# Patient Record
Sex: FEMALE | Race: BLACK OR AFRICAN AMERICAN | NOT HISPANIC OR LATINO
[De-identification: names, ages, dates, MRNs, and addresses within clinical notes are randomized per-mention and may not be internally consistent; named-entity substitution may affect disease eponyms.]

---

## 2017-12-11 ENCOUNTER — APPOINTMENT (OUTPATIENT)
Dept: ORTHOPEDIC SURGERY | Facility: CLINIC | Age: 57
End: 2017-12-11
Payer: MEDICAID

## 2017-12-11 VITALS — DIASTOLIC BLOOD PRESSURE: 74 MMHG | HEART RATE: 71 BPM | SYSTOLIC BLOOD PRESSURE: 107 MMHG

## 2017-12-11 VITALS — BODY MASS INDEX: 33.29 KG/M2 | HEIGHT: 64 IN | WEIGHT: 195 LBS

## 2017-12-11 DIAGNOSIS — M54.16 RADICULOPATHY, LUMBAR REGION: ICD-10-CM

## 2017-12-11 DIAGNOSIS — M51.26 OTHER INTERVERTEBRAL DISC DISPLACEMENT, LUMBAR REGION: ICD-10-CM

## 2017-12-11 DIAGNOSIS — M51.36 OTHER INTERVERTEBRAL DISC DEGENERATION, LUMBAR REGION: ICD-10-CM

## 2017-12-11 PROCEDURE — 99203 OFFICE O/P NEW LOW 30 MIN: CPT

## 2019-01-03 ENCOUNTER — TRANSCRIPTION ENCOUNTER (OUTPATIENT)
Age: 59
End: 2019-01-03

## 2019-02-25 ENCOUNTER — APPOINTMENT (OUTPATIENT)
Dept: ORTHOPEDIC SURGERY | Facility: CLINIC | Age: 59
End: 2019-02-25
Payer: COMMERCIAL

## 2019-02-25 PROCEDURE — 99214 OFFICE O/P EST MOD 30 MIN: CPT

## 2019-03-22 ENCOUNTER — OUTPATIENT (OUTPATIENT)
Dept: OUTPATIENT SERVICES | Facility: HOSPITAL | Age: 59
LOS: 1 days | End: 2019-03-22
Payer: COMMERCIAL

## 2019-03-22 VITALS
SYSTOLIC BLOOD PRESSURE: 113 MMHG | HEIGHT: 64 IN | OXYGEN SATURATION: 99 % | TEMPERATURE: 98 F | WEIGHT: 195.99 LBS | DIASTOLIC BLOOD PRESSURE: 77 MMHG | RESPIRATION RATE: 16 BRPM | HEART RATE: 76 BPM

## 2019-03-22 DIAGNOSIS — R73.03 PREDIABETES: ICD-10-CM

## 2019-03-22 DIAGNOSIS — M65.311 TRIGGER THUMB, RIGHT THUMB: ICD-10-CM

## 2019-03-22 DIAGNOSIS — Z90.710 ACQUIRED ABSENCE OF BOTH CERVIX AND UTERUS: Chronic | ICD-10-CM

## 2019-03-22 DIAGNOSIS — Z01.818 ENCOUNTER FOR OTHER PREPROCEDURAL EXAMINATION: ICD-10-CM

## 2019-03-22 DIAGNOSIS — Z98.890 OTHER SPECIFIED POSTPROCEDURAL STATES: Chronic | ICD-10-CM

## 2019-03-22 DIAGNOSIS — M65.30 TRIGGER FINGER, UNSPECIFIED FINGER: ICD-10-CM

## 2019-03-22 LAB
ANION GAP SERPL CALC-SCNC: 9 MMOL/L — SIGNIFICANT CHANGE UP (ref 5–17)
BUN SERPL-MCNC: 16 MG/DL — SIGNIFICANT CHANGE UP (ref 7–23)
CALCIUM SERPL-MCNC: 9.9 MG/DL — SIGNIFICANT CHANGE UP (ref 8.4–10.5)
CHLORIDE SERPL-SCNC: 102 MMOL/L — SIGNIFICANT CHANGE UP (ref 96–108)
CO2 SERPL-SCNC: 29 MMOL/L — SIGNIFICANT CHANGE UP (ref 22–31)
CREAT SERPL-MCNC: 0.79 MG/DL — SIGNIFICANT CHANGE UP (ref 0.5–1.3)
GLUCOSE SERPL-MCNC: 84 MG/DL — SIGNIFICANT CHANGE UP (ref 70–99)
HBA1C BLD-MCNC: 6.3 % — HIGH (ref 4–5.6)
HCT VFR BLD CALC: 41 % — SIGNIFICANT CHANGE UP (ref 34.5–45)
HGB BLD-MCNC: 12.5 G/DL — SIGNIFICANT CHANGE UP (ref 11.5–15.5)
MCHC RBC-ENTMCNC: 26.5 PG — LOW (ref 27–34)
MCHC RBC-ENTMCNC: 30.5 GM/DL — LOW (ref 32–36)
MCV RBC AUTO: 86.9 FL — SIGNIFICANT CHANGE UP (ref 80–100)
PLATELET # BLD AUTO: 211 K/UL — SIGNIFICANT CHANGE UP (ref 150–400)
POTASSIUM SERPL-MCNC: 4.3 MMOL/L — SIGNIFICANT CHANGE UP (ref 3.5–5.3)
POTASSIUM SERPL-SCNC: 4.3 MMOL/L — SIGNIFICANT CHANGE UP (ref 3.5–5.3)
RBC # BLD: 4.72 M/UL — SIGNIFICANT CHANGE UP (ref 3.8–5.2)
RBC # FLD: 13.5 % — SIGNIFICANT CHANGE UP (ref 10.3–14.5)
SODIUM SERPL-SCNC: 140 MMOL/L — SIGNIFICANT CHANGE UP (ref 135–145)
WBC # BLD: 4.07 K/UL — SIGNIFICANT CHANGE UP (ref 3.8–10.5)
WBC # FLD AUTO: 4.07 K/UL — SIGNIFICANT CHANGE UP (ref 3.8–10.5)

## 2019-03-22 PROCEDURE — 83036 HEMOGLOBIN GLYCOSYLATED A1C: CPT

## 2019-03-22 PROCEDURE — 80048 BASIC METABOLIC PNL TOTAL CA: CPT

## 2019-03-22 PROCEDURE — G0463: CPT

## 2019-03-22 PROCEDURE — 85027 COMPLETE CBC AUTOMATED: CPT

## 2019-03-22 RX ORDER — CELECOXIB 200 MG/1
200 CAPSULE ORAL ONCE
Qty: 0 | Refills: 0 | Status: COMPLETED | OUTPATIENT
Start: 2019-03-29 | End: 2019-03-29

## 2019-03-22 RX ORDER — SODIUM CHLORIDE 9 MG/ML
3 INJECTION INTRAMUSCULAR; INTRAVENOUS; SUBCUTANEOUS EVERY 8 HOURS
Qty: 0 | Refills: 0 | Status: DISCONTINUED | OUTPATIENT
Start: 2019-03-29 | End: 2019-04-13

## 2019-03-22 RX ORDER — LIDOCAINE HCL 20 MG/ML
0.2 VIAL (ML) INJECTION ONCE
Qty: 0 | Refills: 0 | Status: DISCONTINUED | OUTPATIENT
Start: 2019-03-29 | End: 2019-04-13

## 2019-03-22 RX ORDER — ACETAMINOPHEN 500 MG
1000 TABLET ORAL ONCE
Qty: 0 | Refills: 0 | Status: COMPLETED | OUTPATIENT
Start: 2019-03-29 | End: 2019-03-29

## 2019-03-22 NOTE — H&P PST ADULT - LYMPHATIC
posterior cervical L/supraclavicular R/posterior cervical R/anterior cervical R/anterior cervical L/supraclavicular L

## 2019-03-22 NOTE — H&P PST ADULT - NSICDXPROBLEM_GEN_ALL_CORE_FT
PROBLEM DIAGNOSES  Problem: Right trigger finger  Assessment and Plan: Right Trigger Thumb Release  Pre-op education provided - all questions answered     Problem: Prediabetes  Assessment and Plan: Finger stick on day of surgery

## 2019-03-22 NOTE — H&P PST ADULT - NSANTHOSAYNRD_GEN_A_CORE
No. ARELIS screening performed.  STOP BANG Legend: 0-2 = LOW Risk; 3-4 = INTERMEDIATE Risk; 5-8 = HIGH Risk

## 2019-03-22 NOTE — H&P PST ADULT - NEUROLOGICAL DETAILS
alert and oriented x 3/sensation intact/responds to verbal commands/normal strength/no spontaneous movement

## 2019-03-22 NOTE — H&P PST ADULT - HISTORY OF PRESENT ILLNESS
59 y/o female with long history of migraines pt on oxcarbazepine, prediabetic,  C/O Right  Thumb Trigger Finger since 10/2018 with intermittent pain and now scheduled for Right Trigger Thumb Release on 3/29/19. Denies any palpitations, SOB, N/V, fever or chills.

## 2019-03-22 NOTE — H&P PST ADULT - MUSCULOSKELETAL
detailed exam no joint swelling/no joint erythema/no calf tenderness/normal strength/no joint warmth details…

## 2019-03-23 PROBLEM — M65.30 TRIGGER FINGER, UNSPECIFIED FINGER: Chronic | Status: ACTIVE | Noted: 2019-03-22

## 2019-03-28 ENCOUNTER — TRANSCRIPTION ENCOUNTER (OUTPATIENT)
Age: 59
End: 2019-03-28

## 2019-03-28 RX ORDER — CELECOXIB 200 MG/1
200 CAPSULE ORAL ONCE
Qty: 0 | Refills: 0 | Status: DISCONTINUED | OUTPATIENT
Start: 2019-03-29 | End: 2019-04-13

## 2019-03-28 RX ORDER — ONDANSETRON 8 MG/1
4 TABLET, FILM COATED ORAL ONCE
Qty: 0 | Refills: 0 | Status: DISCONTINUED | OUTPATIENT
Start: 2019-03-29 | End: 2019-04-13

## 2019-03-28 RX ORDER — SODIUM CHLORIDE 9 MG/ML
1000 INJECTION, SOLUTION INTRAVENOUS
Qty: 0 | Refills: 0 | Status: DISCONTINUED | OUTPATIENT
Start: 2019-03-29 | End: 2019-04-13

## 2019-03-28 NOTE — ASU DISCHARGE PLAN (ADULT/PEDIATRIC) - CARE PROVIDER_API CALL
Chioma Rand (MD; MPH)  Orthopaedic Surgery  [address], [address]  [place] [address]  Phone: [phone]  Fax: [phone]  Follow Up Time:

## 2019-03-29 ENCOUNTER — APPOINTMENT (OUTPATIENT)
Dept: ORTHOPEDIC SURGERY | Facility: HOSPITAL | Age: 59
End: 2019-03-29

## 2019-03-29 ENCOUNTER — OUTPATIENT (OUTPATIENT)
Dept: OUTPATIENT SERVICES | Facility: HOSPITAL | Age: 59
LOS: 1 days | End: 2019-03-29
Payer: COMMERCIAL

## 2019-03-29 VITALS
HEART RATE: 71 BPM | DIASTOLIC BLOOD PRESSURE: 66 MMHG | OXYGEN SATURATION: 100 % | SYSTOLIC BLOOD PRESSURE: 112 MMHG | TEMPERATURE: 98 F | RESPIRATION RATE: 16 BRPM

## 2019-03-29 VITALS
HEART RATE: 78 BPM | SYSTOLIC BLOOD PRESSURE: 129 MMHG | DIASTOLIC BLOOD PRESSURE: 86 MMHG | TEMPERATURE: 98 F | WEIGHT: 195.99 LBS | HEIGHT: 64 IN | RESPIRATION RATE: 16 BRPM | OXYGEN SATURATION: 100 %

## 2019-03-29 DIAGNOSIS — M65.311 TRIGGER THUMB, RIGHT THUMB: ICD-10-CM

## 2019-03-29 DIAGNOSIS — Z98.890 OTHER SPECIFIED POSTPROCEDURAL STATES: Chronic | ICD-10-CM

## 2019-03-29 DIAGNOSIS — Z90.710 ACQUIRED ABSENCE OF BOTH CERVIX AND UTERUS: Chronic | ICD-10-CM

## 2019-03-29 PROCEDURE — 26055 INCISE FINGER TENDON SHEATH: CPT | Mod: F5

## 2019-03-29 RX ORDER — OXCARBAZEPINE 300 MG/1
1 TABLET, FILM COATED ORAL
Qty: 0 | Refills: 0 | COMMUNITY

## 2019-03-29 RX ADMIN — CELECOXIB 200 MILLIGRAM(S): 200 CAPSULE ORAL at 07:17

## 2019-03-29 RX ADMIN — Medication 1000 MILLIGRAM(S): at 07:18

## 2019-04-08 ENCOUNTER — APPOINTMENT (OUTPATIENT)
Dept: ORTHOPEDIC SURGERY | Facility: CLINIC | Age: 59
End: 2019-04-08
Payer: COMMERCIAL

## 2019-04-08 DIAGNOSIS — M65.311 TRIGGER THUMB, RIGHT THUMB: ICD-10-CM

## 2019-04-08 PROCEDURE — 99024 POSTOP FOLLOW-UP VISIT: CPT

## 2019-04-08 RX ORDER — OXCARBAZEPINE 150 MG/1
TABLET, FILM COATED ORAL
Refills: 0 | Status: ACTIVE | COMMUNITY

## 2020-07-03 ENCOUNTER — TRANSCRIPTION ENCOUNTER (OUTPATIENT)
Age: 60
End: 2020-07-03

## 2025-03-05 NOTE — H&P PST ADULT - MAMMOGRAM, LAST, PROFILE
Recently dx Afib not in RVR  on home Eliquis 5mg BID  COMFORT CARE MEASURES ONLY, MEWS EXEMPT  medications discontinued In the setting of advanced multiple myeloma (with likely component of cancer cachexia, patient now essentially bedbound and total care at baseline.  At significantly increased risk of aspiration, recurrent infections, worsening skin failure/breakdown, and repeat hospital admissions.  Patient grossly hospice appropriate.    PT eval appreciated, recommending [facility] (but poor candidate), daughter now wishes to proceed directly to LTC placement with hospice    Recommend:  Bedside PT as tolerated (needs mental status to improve first)  Offloading of heels  Frequent turning and repositioning in bed Q 2 hrs as tolerated. In the setting of advanced multiple myeloma (with likely component of cancer cachexia, patient now essentially bedbound and total care at baseline.  At significantly increased risk of aspiration, recurrent infections, worsening skin failure/breakdown, and repeat hospital admissions.  Patient grossly hospice appropriate.    PT eval appreciated, recommending [facility] (but poor candidate), daughter now wishes to proceed directly to LTC placement with hospice    Recommend:  Bedside PT as tolerated (needs mental status to improve first)  Offloading of heels  Frequent turning and repositioning in bed Q 2 hrs as tolerated. Recently dx Afib not in RVR  on home Eliquis 5mg BID  c/w home meds  consider holding iso multiple falls  CHADsVASC: 4 (73+, F, HTN, 2018